# Patient Record
Sex: MALE | Race: WHITE | NOT HISPANIC OR LATINO | URBAN - METROPOLITAN AREA
[De-identification: names, ages, dates, MRNs, and addresses within clinical notes are randomized per-mention and may not be internally consistent; named-entity substitution may affect disease eponyms.]

---

## 2023-04-02 ENCOUNTER — EMERGENCY (EMERGENCY)
Facility: HOSPITAL | Age: 88
LOS: 0 days | Discharge: HOME | End: 2023-04-02
Attending: EMERGENCY MEDICINE
Payer: MEDICARE

## 2023-04-02 VITALS
RESPIRATION RATE: 15 BRPM | HEART RATE: 73 BPM | DIASTOLIC BLOOD PRESSURE: 69 MMHG | SYSTOLIC BLOOD PRESSURE: 156 MMHG | TEMPERATURE: 98 F | OXYGEN SATURATION: 98 %

## 2023-04-02 VITALS
TEMPERATURE: 98 F | SYSTOLIC BLOOD PRESSURE: 159 MMHG | OXYGEN SATURATION: 95 % | RESPIRATION RATE: 20 BRPM | HEART RATE: 129 BPM | HEIGHT: 74 IN | WEIGHT: 214.95 LBS | DIASTOLIC BLOOD PRESSURE: 86 MMHG

## 2023-04-02 DIAGNOSIS — Z91.041 RADIOGRAPHIC DYE ALLERGY STATUS: ICD-10-CM

## 2023-04-02 DIAGNOSIS — G89.29 OTHER CHRONIC PAIN: ICD-10-CM

## 2023-04-02 DIAGNOSIS — R55 SYNCOPE AND COLLAPSE: ICD-10-CM

## 2023-04-02 DIAGNOSIS — M25.511 PAIN IN RIGHT SHOULDER: ICD-10-CM

## 2023-04-02 DIAGNOSIS — R42 DIZZINESS AND GIDDINESS: ICD-10-CM

## 2023-04-02 DIAGNOSIS — Z20.822 CONTACT WITH AND (SUSPECTED) EXPOSURE TO COVID-19: ICD-10-CM

## 2023-04-02 DIAGNOSIS — E78.5 HYPERLIPIDEMIA, UNSPECIFIED: ICD-10-CM

## 2023-04-02 DIAGNOSIS — E86.0 DEHYDRATION: ICD-10-CM

## 2023-04-02 DIAGNOSIS — R00.0 TACHYCARDIA, UNSPECIFIED: ICD-10-CM

## 2023-04-02 DIAGNOSIS — I10 ESSENTIAL (PRIMARY) HYPERTENSION: ICD-10-CM

## 2023-04-02 LAB
ALBUMIN SERPL ELPH-MCNC: 3.9 G/DL — SIGNIFICANT CHANGE UP (ref 3.3–5)
ALP SERPL-CCNC: 67 U/L — SIGNIFICANT CHANGE UP (ref 40–120)
ALT FLD-CCNC: 35 U/L — SIGNIFICANT CHANGE UP (ref 12–78)
ANION GAP SERPL CALC-SCNC: 6 MMOL/L — SIGNIFICANT CHANGE UP (ref 5–17)
APPEARANCE UR: CLEAR — SIGNIFICANT CHANGE UP
APTT BLD: 30.5 SEC — SIGNIFICANT CHANGE UP (ref 27.5–35.5)
AST SERPL-CCNC: 29 U/L — SIGNIFICANT CHANGE UP (ref 15–37)
BACTERIA # UR AUTO: ABNORMAL
BASOPHILS # BLD AUTO: 0.03 K/UL — SIGNIFICANT CHANGE UP (ref 0–0.2)
BASOPHILS NFR BLD AUTO: 0.4 % — SIGNIFICANT CHANGE UP (ref 0–2)
BILIRUB SERPL-MCNC: 0.6 MG/DL — SIGNIFICANT CHANGE UP (ref 0.2–1.2)
BILIRUB UR-MCNC: NEGATIVE — SIGNIFICANT CHANGE UP
BUN SERPL-MCNC: 19 MG/DL — SIGNIFICANT CHANGE UP (ref 7–23)
CALCIUM SERPL-MCNC: 9.1 MG/DL — SIGNIFICANT CHANGE UP (ref 8.5–10.1)
CHLORIDE SERPL-SCNC: 107 MMOL/L — SIGNIFICANT CHANGE UP (ref 96–108)
CO2 SERPL-SCNC: 25 MMOL/L — SIGNIFICANT CHANGE UP (ref 22–31)
COLOR SPEC: YELLOW — SIGNIFICANT CHANGE UP
CREAT SERPL-MCNC: 1.24 MG/DL — SIGNIFICANT CHANGE UP (ref 0.5–1.3)
DIFF PNL FLD: NEGATIVE — SIGNIFICANT CHANGE UP
EGFR: 57 ML/MIN/1.73M2 — LOW
EOSINOPHIL # BLD AUTO: 0.08 K/UL — SIGNIFICANT CHANGE UP (ref 0–0.5)
EOSINOPHIL NFR BLD AUTO: 1.2 % — SIGNIFICANT CHANGE UP (ref 0–6)
EPI CELLS # UR: SIGNIFICANT CHANGE UP
FLUAV AG NPH QL: SIGNIFICANT CHANGE UP
FLUBV AG NPH QL: SIGNIFICANT CHANGE UP
GLUCOSE SERPL-MCNC: 117 MG/DL — HIGH (ref 70–99)
GLUCOSE UR QL: NEGATIVE MG/DL — SIGNIFICANT CHANGE UP
HCT VFR BLD CALC: 46.7 % — SIGNIFICANT CHANGE UP (ref 39–50)
HGB BLD-MCNC: 15.7 G/DL — SIGNIFICANT CHANGE UP (ref 13–17)
IMM GRANULOCYTES NFR BLD AUTO: 0.1 % — SIGNIFICANT CHANGE UP (ref 0–0.9)
INR BLD: 1.05 RATIO — SIGNIFICANT CHANGE UP (ref 0.88–1.16)
KETONES UR-MCNC: ABNORMAL
LEUKOCYTE ESTERASE UR-ACNC: NEGATIVE — SIGNIFICANT CHANGE UP
LYMPHOCYTES # BLD AUTO: 0.93 K/UL — LOW (ref 1–3.3)
LYMPHOCYTES # BLD AUTO: 13.9 % — SIGNIFICANT CHANGE UP (ref 13–44)
MCHC RBC-ENTMCNC: 32 PG — SIGNIFICANT CHANGE UP (ref 27–34)
MCHC RBC-ENTMCNC: 33.6 G/DL — SIGNIFICANT CHANGE UP (ref 32–36)
MCV RBC AUTO: 95.3 FL — SIGNIFICANT CHANGE UP (ref 80–100)
MONOCYTES # BLD AUTO: 0.66 K/UL — SIGNIFICANT CHANGE UP (ref 0–0.9)
MONOCYTES NFR BLD AUTO: 9.9 % — SIGNIFICANT CHANGE UP (ref 2–14)
NEUTROPHILS # BLD AUTO: 4.96 K/UL — SIGNIFICANT CHANGE UP (ref 1.8–7.4)
NEUTROPHILS NFR BLD AUTO: 74.5 % — SIGNIFICANT CHANGE UP (ref 43–77)
NITRITE UR-MCNC: NEGATIVE — SIGNIFICANT CHANGE UP
NRBC # BLD: 0 /100 WBCS — SIGNIFICANT CHANGE UP (ref 0–0)
PH UR: 7 — SIGNIFICANT CHANGE UP (ref 5–8)
PLATELET # BLD AUTO: 205 K/UL — SIGNIFICANT CHANGE UP (ref 150–400)
POTASSIUM SERPL-MCNC: 4.3 MMOL/L — SIGNIFICANT CHANGE UP (ref 3.5–5.3)
POTASSIUM SERPL-SCNC: 4.3 MMOL/L — SIGNIFICANT CHANGE UP (ref 3.5–5.3)
PROT SERPL-MCNC: 7.6 GM/DL — SIGNIFICANT CHANGE UP (ref 6–8.3)
PROT UR-MCNC: 30 MG/DL
PROTHROM AB SERPL-ACNC: 12.5 SEC — SIGNIFICANT CHANGE UP (ref 10.5–13.4)
RBC # BLD: 4.9 M/UL — SIGNIFICANT CHANGE UP (ref 4.2–5.8)
RBC # FLD: 11.6 % — SIGNIFICANT CHANGE UP (ref 10.3–14.5)
RBC CASTS # UR COMP ASSIST: SIGNIFICANT CHANGE UP /HPF (ref 0–4)
SARS-COV-2 RNA SPEC QL NAA+PROBE: SIGNIFICANT CHANGE UP
SODIUM SERPL-SCNC: 138 MMOL/L — SIGNIFICANT CHANGE UP (ref 135–145)
SP GR SPEC: 1.01 — SIGNIFICANT CHANGE UP (ref 1.01–1.02)
TROPONIN I, HIGH SENSITIVITY RESULT: 8.7 NG/L — SIGNIFICANT CHANGE UP
UROBILINOGEN FLD QL: NEGATIVE MG/DL — SIGNIFICANT CHANGE UP
WBC # BLD: 6.67 K/UL — SIGNIFICANT CHANGE UP (ref 3.8–10.5)
WBC # FLD AUTO: 6.67 K/UL — SIGNIFICANT CHANGE UP (ref 3.8–10.5)
WBC UR QL: SIGNIFICANT CHANGE UP

## 2023-04-02 PROCEDURE — 99285 EMERGENCY DEPT VISIT HI MDM: CPT

## 2023-04-02 PROCEDURE — 93010 ELECTROCARDIOGRAM REPORT: CPT

## 2023-04-02 RX ORDER — TRAMADOL HYDROCHLORIDE 50 MG/1
1 TABLET ORAL
Qty: 9 | Refills: 0
Start: 2023-04-02 | End: 2023-04-04

## 2023-04-02 RX ORDER — SODIUM CHLORIDE 9 MG/ML
1000 INJECTION INTRAMUSCULAR; INTRAVENOUS; SUBCUTANEOUS ONCE
Refills: 0 | Status: COMPLETED | OUTPATIENT
Start: 2023-04-02 | End: 2023-04-02

## 2023-04-02 RX ORDER — SODIUM CHLORIDE 9 MG/ML
1000 INJECTION INTRAMUSCULAR; INTRAVENOUS; SUBCUTANEOUS ONCE
Refills: 0 | Status: DISCONTINUED | OUTPATIENT
Start: 2023-04-02 | End: 2023-04-02

## 2023-04-02 RX ADMIN — SODIUM CHLORIDE 500 MILLILITER(S): 9 INJECTION INTRAMUSCULAR; INTRAVENOUS; SUBCUTANEOUS at 12:58

## 2023-04-02 NOTE — ED PROVIDER NOTE - OBJECTIVE STATEMENT
87 year old male with PMH of HTN, HLD otherwise presenting to ED due to dizziness - feeling of being light headed earlier this AM - states in past when it has occurred would be seen in ED and given fluids and improve - was told that there was dehydration in past 3-4 times.

## 2023-04-02 NOTE — ED PROVIDER NOTE - NSFOLLOWUPINSTRUCTIONS_ED_ALL_ED_FT
Dehydration, Adult  Dehydration is a condition in which there is not enough water or other fluids in the body. This happens when a person loses more fluids than he or she takes in. Important organs, such as the kidneys, brain, and heart, cannot function without a proper amount of fluids. Any loss of fluids from the body can lead to dehydration.    Dehydration can be mild, moderate, or severe. It should be treated right away to prevent it from becoming severe.    What are the causes?  Dehydration may be caused by:  Conditions that cause loss of water or other fluids, such as diarrhea, vomiting, or sweating or urinating a lot.  Not drinking enough fluids, especially when you are ill or doing activities that require a lot of energy.  Other illnesses and conditions, such as fever or infection.  Certain medicines, such as medicines that remove excess fluid from the body (diuretics).  Lack of safe drinking water.  Not being able to get enough water and food.  What increases the risk?  The following factors may make you more likely to develop this condition:  Having a long-term (chronic) illness that has not been treated properly, such as diabetes, heart disease, or kidney disease.  Being 65 years of age or older.  Having a disability.  Living in a place that is high in altitude, where thinner, drier air causes more fluid loss.  Doing exercises that put stress on your body for a long time (endurance sports).  What are the signs or symptoms?  Symptoms of dehydration depend on how severe it is.    Mild or moderate dehydration    Thirst.  Dry lips or dry mouth.  Dizziness or light-headedness, especially when standing up from a seated position.  Muscle cramps.  Dark urine. Urine may be the color of tea.  Less urine or tears produced than usual.  Headache.  Severe dehydration    Changes in skin. Your skin may be cold and clammy, blotchy, or pale. Your skin also may not return to normal after being lightly pinched and released.  Little or no tears, urine, or sweat.  Changes in vital signs, such as rapid breathing and low blood pressure. Your pulse may be weak or may be faster than 100 beats a minute when you are sitting still.  Other changes, such as:  Feeling very thirsty.  Sunken eyes.  Cold hands and feet.  Confusion.  Being very tired (lethargic) or having trouble waking from sleep.  Short-term weight loss.  Loss of consciousness.  How is this diagnosed?  This condition is diagnosed based on your symptoms and a physical exam. You may have blood and urine tests to help confirm the diagnosis.    How is this treated?  Treatment for this condition depends on how severe it is. Treatment should be started right away. Do not wait until dehydration becomes severe. Severe dehydration is an emergency and needs to be treated in a hospital.  Mild or moderate dehydration can be treated at home. You may be asked to:  Drink more fluids.  Drink an oral rehydration solution (ORS). This drink helps restore proper amounts of fluids and salts and minerals in the blood (electrolytes).  Severe dehydration can be treated:  With IV fluids.  By correcting abnormal levels of electrolytes. This is often done by giving electrolytes through a tube that is passed through your nose and into your stomach (nasogastric tube, or NG tube).  By treating the underlying cause of dehydration.  Follow these instructions at home:  Oral rehydration solution    If told by your health care provider, drink an ORS:  Make an ORS by following instructions on the package.  Start by drinking small amounts, about ½ cup (120 mL) every 5–10 minutes.  Slowly increase how much you drink until you have taken the amount recommended by your health care provider.  Eating and drinking          Drink enough clear fluid to keep your urine pale yellow. If you were told to drink an ORS, finish the ORS first and then start slowly drinking other clear fluids. Drink fluids such as:  Water. Do not drink only water. Doing that can lead to hyponatremia, which is having too little salt (sodium) in the body.  Water from ice chips you suck on.  Fruit juice that you have added water to (diluted fruit juice).  Low-calorie sports drinks.  Eat foods that contain a healthy balance of electrolytes, such as bananas, oranges, potatoes, tomatoes, and spinach.  Do not drink alcohol.  Avoid the following:  Drinks that contain a lot of sugar. These include high-calorie sports drinks, fruit juice that is not diluted, and soda.  Caffeine.  Foods that are greasy or contain a lot of fat or sugar.  General instructions    Take over-the-counter and prescription medicines only as told by your health care provider.  Do not take sodium tablets. Doing that can lead to having too much sodium in the body (hypernatremia).  Return to your normal activities as told by your health care provider. Ask your health care provider what activities are safe for you.  Keep all follow-up visits as told by your health care provider. This is important.  Contact a health care provider if:  You have muscle cramps, pain, or discomfort, such as:  Pain in your abdomen and the pain gets worse or stays in one area (localizes).  Stiff neck.  You have a rash.  You are more irritable than usual.  You are sleepier or have a harder time waking than usual.  You feel weak or dizzy.  You feel very thirsty.  Get help right away if you have:  Any symptoms of severe dehydration.  Symptoms of vomiting, such as:  You cannot eat or drink without vomiting.  Vomiting gets worse or does not go away.  Vomit includes blood or green matter (bile).  Symptoms that get worse with treatment.  A fever.  A severe headache.  Problems with urination or bowel movements, such as:  Diarrhea that gets worse or does not go away.  Blood in your stool (feces). This may cause stool to look black and tarry.  Not urinating, or urinating only a small amount of very dark urine, within 6–8 hours.  Trouble breathing.  These symptoms may represent a serious problem that is an emergency. Do not wait to see if the symptoms will go away. Get medical help right away. Call your local emergency services (911 in the [place] Do not drive yourself to the hospital.    Summary  Dehydration is a condition in which there is not enough water or other fluids in the body. This happens when a person loses more fluids than he or she takes in.  Treatment for this condition depends on how severe it is. Treatment should be started right away. Do not wait until dehydration becomes severe.  Drink enough clear fluid to keep your urine pale yellow. If you were told to drink an oral rehydration solution (ORS), finish the ORS first and then start slowly drinking other clear fluids.  Take over-the-counter and prescription medicines only as told by your health care provider.  Get help right away if you have any symptoms of severe dehydration.  This information is not intended to replace advice given to you by your health care provider. Make sure you discuss any questions you have with your health care provider.

## 2023-04-02 NOTE — ED ADULT NURSE NOTE - OBJECTIVE STATEMENT
a&ox4. patient C/O increased stress related issues. patient lost money now living in hotel. patient denies Suicidal/ homicidal  plan at this time. patient states drinks 2 glasses of vodka daily with meals. patient denies CP/SOB at this time.

## 2023-04-02 NOTE — ED ADULT TRIAGE NOTE - CHIEF COMPLAINT QUOTE
Patient visiting from [place] c/o waking up with dizziness, lightheadedness and weakness  which has been going on since 3/30/23, states it happen 3 times before in [place] and was told its dehydration. Denies chest pains or sob. hx htn, diabetes.

## 2023-04-02 NOTE — ED ADULT TRIAGE NOTE - PATIENT ON (OXYGEN DELIVERY METHOD)
· CT scan revealed:  No evidence of pulmonary embolism  No focal consolidation  Small hiatal hernia and thickening of the esophagus which may be due to esophagitis, correlate with endoscopy  Nodular contour of the liver, correlate for cirrhosis  Cholelithiasis without evidence of cholecystitis  · Outpatient follow-up with gastroenterologist   This is being set up already  room air

## 2023-04-02 NOTE — ED PROVIDER NOTE - CLINICAL SUMMARY MEDICAL DECISION MAKING FREE TEXT BOX
pt with likely mild dehydration related near syncopal episode - otherwise evaluated in ED with labs to r/o ACS or CAD related cause, tachycardia improved after fluids - will dc with PMD follow up. Pt complaining of some chronic pain to R shoulder - will give script for tramadol pt with likely mild dehydration related near syncopal episode - otherwise evaluated in ED with labs to r/o ACS or CAD related cause, tachycardia improved after fluids - will dc with PMD follow up. Pt complaining of some chronic pain to R shoulder - will give script for tramadol.  Orthostatics initially noted with BP drop 20 points systolic on standing - will give 2nd liter of fluids and then at repeat at time of dc negative after fluid hydration.

## 2023-04-02 NOTE — ED PROVIDER NOTE - PATIENT PORTAL LINK FT
You can access the FollowMyHealth Patient Portal offered by [facility] by registering at the following website: [email or web address]. By joining [employer]’s FollowMyHealth portal, you will also be able to view your health information using other applications (apps) compatible with our system. You can access the FollowMyHealth Patient Portal offered by [facility] by registering at the following website: [email or web address]. By joining [employer]’s FollowMyHealth portal, you will also be able to view your health information using other applications (apps) compatible with our system.

## 2023-04-02 NOTE — ED ADULT NURSE NOTE - NSIMPLEMENTINTERV_GEN_ALL_ED
Implemented All Fall with Harm Risk Interventions:  [place] to call system. Call bell, personal items and telephone within reach. Instruct patient to call for assistance. Room bathroom lighting operational. Non-slip footwear when patient is off stretcher. Physically safe environment: no spills, clutter or unnecessary equipment. Stretcher in lowest position, wheels locked, appropriate side rails in place. Provide visual cue, wrist band, yellow gown, etc. Monitor gait and stability. Monitor for mental status changes and reorient to person, place, and time. Review medications for side effects contributing to fall risk. Reinforce activity limits and safety measures with patient and family. Provide visual clues: red socks.

## 2023-04-03 LAB
CULTURE RESULTS: SIGNIFICANT CHANGE UP
SPECIMEN SOURCE: SIGNIFICANT CHANGE UP